# Patient Record
Sex: FEMALE | Race: ASIAN | Employment: FULL TIME | URBAN - METROPOLITAN AREA
[De-identification: names, ages, dates, MRNs, and addresses within clinical notes are randomized per-mention and may not be internally consistent; named-entity substitution may affect disease eponyms.]

---

## 2023-08-07 LAB
CHOLEST SERPL-MCNC: 179 MG/DL
GLUCOSE SERPL-MCNC: 93 MG/DL (ref 70–99)
HDLC SERPL-MCNC: 50 MG/DL
LDLC SERPL CALC-MCNC: 92 MG/DL
PATIENT FASTING?: ABNORMAL
TRIGL SERPL-MCNC: 183 MG/DL

## 2024-04-16 ENCOUNTER — HOSPITAL ENCOUNTER (OUTPATIENT)
Age: 34
Discharge: HOME | End: 2024-04-16
Payer: COMMERCIAL

## 2024-04-16 ENCOUNTER — HOSPITAL ENCOUNTER (OUTPATIENT)
Dept: GENERAL RADIOLOGY | Age: 34
Discharge: HOME | End: 2024-04-16
Payer: COMMERCIAL

## 2024-04-16 DIAGNOSIS — M25.511 ACUTE PAIN OF RIGHT SHOULDER: ICD-10-CM

## 2024-04-16 PROCEDURE — 73030 X-RAY EXAM OF SHOULDER: CPT

## 2025-07-31 ENCOUNTER — HOSPITAL ENCOUNTER (OUTPATIENT)
Age: 35
Setting detail: SPECIMEN
Discharge: HOME | End: 2025-07-31
Payer: COMMERCIAL

## 2025-07-31 ENCOUNTER — OFFICE VISIT (OUTPATIENT)
Dept: OBGYN | Age: 35
End: 2025-07-31
Payer: COMMERCIAL

## 2025-07-31 VITALS
HEIGHT: 61 IN | BODY MASS INDEX: 46.07 KG/M2 | HEART RATE: 87 BPM | WEIGHT: 244 LBS | SYSTOLIC BLOOD PRESSURE: 130 MMHG | DIASTOLIC BLOOD PRESSURE: 78 MMHG

## 2025-07-31 DIAGNOSIS — Z01.419 ENCOUNTER FOR ANNUAL ROUTINE GYNECOLOGICAL EXAMINATION: Primary | ICD-10-CM

## 2025-07-31 DIAGNOSIS — N94.6 DYSMENORRHEA: ICD-10-CM

## 2025-07-31 DIAGNOSIS — Z11.51 ENCOUNTER FOR SCREENING FOR HUMAN PAPILLOMAVIRUS (HPV): ICD-10-CM

## 2025-07-31 PROCEDURE — 99396 PREV VISIT EST AGE 40-64: CPT | Performed by: OBSTETRICS & GYNECOLOGY

## 2025-07-31 PROCEDURE — G0123 SCREEN CERV/VAG THIN LAYER: HCPCS

## 2025-07-31 RX ORDER — ACETAMINOPHEN AND CODEINE PHOSPHATE 120; 12 MG/5ML; MG/5ML
1 SOLUTION ORAL DAILY
Qty: 84 TABLET | Refills: 4 | Status: SHIPPED | OUTPATIENT
Start: 2025-07-31

## 2025-07-31 RX ORDER — ACETAMINOPHEN AND CODEINE PHOSPHATE 120; 12 MG/5ML; MG/5ML
1 SOLUTION ORAL DAILY
COMMUNITY
Start: 2025-07-16 | End: 2025-07-31

## 2025-07-31 RX ORDER — LOSARTAN POTASSIUM 25 MG/1
25 TABLET ORAL DAILY
COMMUNITY
Start: 2025-07-17

## 2025-07-31 SDOH — ECONOMIC STABILITY: FOOD INSECURITY: WITHIN THE PAST 12 MONTHS, THE FOOD YOU BOUGHT JUST DIDN'T LAST AND YOU DIDN'T HAVE MONEY TO GET MORE.: NEVER TRUE

## 2025-07-31 SDOH — ECONOMIC STABILITY: FOOD INSECURITY: WITHIN THE PAST 12 MONTHS, YOU WORRIED THAT YOUR FOOD WOULD RUN OUT BEFORE YOU GOT MONEY TO BUY MORE.: NEVER TRUE

## 2025-07-31 ASSESSMENT — PATIENT HEALTH QUESTIONNAIRE - PHQ9
2. FEELING DOWN, DEPRESSED OR HOPELESS: NOT AT ALL
SUM OF ALL RESPONSES TO PHQ QUESTIONS 1-9: 0
SUM OF ALL RESPONSES TO PHQ QUESTIONS 1-9: 0
1. LITTLE INTEREST OR PLEASURE IN DOING THINGS: NOT AT ALL
SUM OF ALL RESPONSES TO PHQ QUESTIONS 1-9: 0
SUM OF ALL RESPONSES TO PHQ QUESTIONS 1-9: 0

## 2025-07-31 NOTE — PROGRESS NOTES
25    Simona Alicea  1990    Chief Complaint   Patient presents with    Annual Exam     Annual exam. Non-smoker No known h/o dvt.LMP .Periods are irregular.Patient is not currently sexually active. Patient is on oral contraception. Pap Smear has never been obtained. Patient complains of irregular menses, pt states \"feels something on cervix but does not hurt\".  Pt declined pregnancy test.         Simona Alicea is a 35 y.o. female who presents today for evaluation of annual exam    Past Medical History:   Diagnosis Date    Dysmenorrhea     Irregular menses        No past surgical history on file.    Social History     Tobacco Use    Smoking status: Former     Current packs/day: 0.00     Types: Cigarettes     Quit date: 2019     Years since quittin.5    Smokeless tobacco: Never   Vaping Use    Vaping status: Never Used   Substance Use Topics    Alcohol use: Never    Drug use: Never       Family History   Problem Relation Age of Onset    Hypertension Father     Heart Disease Father     Hypertension Mother        Current Outpatient Medications   Medication Sig Dispense Refill    losartan (COZAAR) 25 MG tablet Take 1 tablet by mouth daily      norethindrone (MICRONOR) 0.35 MG tablet Take 1 tablet by mouth daily 84 tablet 4     No current facility-administered medications for this visit.       No Known Allergies          There is no immunization history on file for this patient.    Review of Systems    /78   Pulse 87   Ht 1.549 m (5' 1\")   Wt 110.7 kg (244 lb)   LMP 2025   BMI 46.10 kg/m²     Physical Exam  Exam conducted with a chaperone present.   Constitutional:       Appearance: Normal appearance.   HENT:      Head: Normocephalic and atraumatic.      Nose: Nose normal.   Eyes:      Conjunctiva/sclera: Conjunctivae normal.   Cardiovascular:      Rate and Rhythm: Normal rate.      Pulses: Normal pulses.   Pulmonary:      Effort: Pulmonary effort is normal.

## 2025-08-07 LAB
COMMENT: NORMAL
GYNECOLOGY CYTOLOGY REPORT: NORMAL
HPV OTHER HR TYPES: NOT DETECTED
HPV TYPE 16: NOT DETECTED
HPV TYPE 18: NOT DETECTED